# Patient Record
Sex: FEMALE | Race: WHITE | Employment: OTHER
[De-identification: names, ages, dates, MRNs, and addresses within clinical notes are randomized per-mention and may not be internally consistent; named-entity substitution may affect disease eponyms.]

---

## 2024-08-30 ENCOUNTER — APPOINTMENT (OUTPATIENT)
Facility: HOSPITAL | Age: 69
DRG: 641 | End: 2024-08-30
Payer: MEDICARE

## 2024-08-30 ENCOUNTER — HOSPITAL ENCOUNTER (INPATIENT)
Facility: HOSPITAL | Age: 69
LOS: 3 days | Discharge: HOME | DRG: 641 | End: 2024-09-02
Attending: STUDENT IN AN ORGANIZED HEALTH CARE EDUCATION/TRAINING PROGRAM | Admitting: INTERNAL MEDICINE
Payer: MEDICARE

## 2024-08-30 DIAGNOSIS — R53.1 GENERALIZED WEAKNESS: Primary | ICD-10-CM

## 2024-08-30 DIAGNOSIS — E83.52 HYPERCALCEMIA: ICD-10-CM

## 2024-08-30 DIAGNOSIS — N28.9 RENAL INSUFFICIENCY: ICD-10-CM

## 2024-08-30 DIAGNOSIS — R42 DIZZINESS: ICD-10-CM

## 2024-08-30 LAB
ALBUMIN SERPL-MCNC: 3.6 G/DL (ref 3.5–5)
ALBUMIN/GLOB SERPL: 0.8 (ref 1.1–2.2)
ALP SERPL-CCNC: 70 U/L (ref 45–117)
ALT SERPL-CCNC: 24 U/L (ref 12–78)
ANION GAP SERPL CALC-SCNC: 8 MMOL/L (ref 5–15)
APPEARANCE UR: CLEAR
AST SERPL-CCNC: 39 U/L (ref 15–37)
BACTERIA URNS QL MICRO: NEGATIVE /HPF
BASOPHILS # BLD: 0 K/UL (ref 0–0.1)
BASOPHILS NFR BLD: 0 % (ref 0–1)
BILIRUB SERPL-MCNC: 0.9 MG/DL (ref 0.2–1)
BILIRUB UR QL: NEGATIVE
BUN SERPL-MCNC: 52 MG/DL (ref 6–20)
BUN/CREAT SERPL: 26 (ref 12–20)
CALCIUM SERPL-MCNC: 13.3 MG/DL (ref 8.5–10.1)
CHLORIDE SERPL-SCNC: 101 MMOL/L (ref 97–108)
CO2 SERPL-SCNC: 30 MMOL/L (ref 21–32)
COLOR UR: NORMAL
COMMENT:: NORMAL
CREAT SERPL-MCNC: 1.99 MG/DL (ref 0.55–1.02)
DIFFERENTIAL METHOD BLD: ABNORMAL
EKG ATRIAL RATE: 61 BPM
EKG DIAGNOSIS: NORMAL
EKG P AXIS: 59 DEGREES
EKG P-R INTERVAL: 158 MS
EKG Q-T INTERVAL: 392 MS
EKG QRS DURATION: 76 MS
EKG QTC CALCULATION (BAZETT): 394 MS
EKG R AXIS: 11 DEGREES
EKG T AXIS: 24 DEGREES
EKG VENTRICULAR RATE: 61 BPM
EOSINOPHIL # BLD: 0.1 K/UL (ref 0–0.4)
EOSINOPHIL NFR BLD: 1 % (ref 0–7)
EPITH CASTS URNS QL MICRO: NORMAL /LPF
ERYTHROCYTE [DISTWIDTH] IN BLOOD BY AUTOMATED COUNT: 14 % (ref 11.5–14.5)
GLOBULIN SER CALC-MCNC: 4.6 G/DL (ref 2–4)
GLUCOSE SERPL-MCNC: 102 MG/DL (ref 65–100)
GLUCOSE UR STRIP.AUTO-MCNC: NEGATIVE MG/DL
HCT VFR BLD AUTO: 35.6 % (ref 35–47)
HGB BLD-MCNC: 11.8 G/DL (ref 11.5–16)
HGB UR QL STRIP: NEGATIVE
IMM GRANULOCYTES # BLD AUTO: 0 K/UL (ref 0–0.04)
IMM GRANULOCYTES NFR BLD AUTO: 0 % (ref 0–0.5)
KETONES UR QL STRIP.AUTO: NEGATIVE MG/DL
LEUKOCYTE ESTERASE UR QL STRIP.AUTO: NEGATIVE
LYMPHOCYTES # BLD: 2.6 K/UL (ref 0.8–3.5)
LYMPHOCYTES NFR BLD: 31 % (ref 12–49)
MAGNESIUM SERPL-MCNC: 1.5 MG/DL (ref 1.6–2.4)
MCH RBC QN AUTO: 28.2 PG (ref 26–34)
MCHC RBC AUTO-ENTMCNC: 33.1 G/DL (ref 30–36.5)
MCV RBC AUTO: 85 FL (ref 80–99)
MONOCYTES # BLD: 1.1 K/UL (ref 0–1)
MONOCYTES NFR BLD: 13 % (ref 5–13)
NEUTS SEG # BLD: 4.5 K/UL (ref 1.8–8)
NEUTS SEG NFR BLD: 55 % (ref 32–75)
NITRITE UR QL STRIP.AUTO: NEGATIVE
NRBC # BLD: 0 K/UL (ref 0–0.01)
NRBC BLD-RTO: 0 PER 100 WBC
PH UR STRIP: 6.5 (ref 5–8)
PLATELET # BLD AUTO: 218 K/UL (ref 150–400)
PMV BLD AUTO: 12 FL (ref 8.9–12.9)
POTASSIUM SERPL-SCNC: 3.7 MMOL/L (ref 3.5–5.1)
PROT SERPL-MCNC: 8.2 G/DL (ref 6.4–8.2)
PROT UR STRIP-MCNC: NEGATIVE MG/DL
RBC # BLD AUTO: 4.19 M/UL (ref 3.8–5.2)
RBC #/AREA URNS HPF: NORMAL /HPF (ref 0–5)
SODIUM SERPL-SCNC: 139 MMOL/L (ref 136–145)
SP GR UR REFRACTOMETRY: 1.01 (ref 1–1.03)
T4 FREE SERPL-MCNC: 1.2 NG/DL (ref 0.8–1.5)
TSH SERPL DL<=0.05 MIU/L-ACNC: 2.67 UIU/ML (ref 0.36–3.74)
UROBILINOGEN UR QL STRIP.AUTO: 0.2 EU/DL (ref 0.2–1)
WBC # BLD AUTO: 8.3 K/UL (ref 3.6–11)
WBC URNS QL MICRO: NORMAL /HPF (ref 0–4)

## 2024-08-30 PROCEDURE — 99285 EMERGENCY DEPT VISIT HI MDM: CPT

## 2024-08-30 PROCEDURE — 2580000003 HC RX 258: Performed by: STUDENT IN AN ORGANIZED HEALTH CARE EDUCATION/TRAINING PROGRAM

## 2024-08-30 PROCEDURE — 36415 COLL VENOUS BLD VENIPUNCTURE: CPT

## 2024-08-30 PROCEDURE — 1200000000 HC SEMI PRIVATE

## 2024-08-30 PROCEDURE — 84443 ASSAY THYROID STIM HORMONE: CPT

## 2024-08-30 PROCEDURE — 6360000002 HC RX W HCPCS: Performed by: INTERNAL MEDICINE

## 2024-08-30 PROCEDURE — 93005 ELECTROCARDIOGRAM TRACING: CPT | Performed by: STUDENT IN AN ORGANIZED HEALTH CARE EDUCATION/TRAINING PROGRAM

## 2024-08-30 PROCEDURE — 2580000003 HC RX 258: Performed by: INTERNAL MEDICINE

## 2024-08-30 PROCEDURE — 71045 X-RAY EXAM CHEST 1 VIEW: CPT

## 2024-08-30 PROCEDURE — 85025 COMPLETE CBC W/AUTO DIFF WBC: CPT

## 2024-08-30 PROCEDURE — 93010 ELECTROCARDIOGRAM REPORT: CPT | Performed by: SPECIALIST

## 2024-08-30 PROCEDURE — 84439 ASSAY OF FREE THYROXINE: CPT

## 2024-08-30 PROCEDURE — 81001 URINALYSIS AUTO W/SCOPE: CPT

## 2024-08-30 PROCEDURE — 83735 ASSAY OF MAGNESIUM: CPT

## 2024-08-30 PROCEDURE — 80053 COMPREHEN METABOLIC PANEL: CPT

## 2024-08-30 PROCEDURE — 70450 CT HEAD/BRAIN W/O DYE: CPT

## 2024-08-30 RX ORDER — MAGNESIUM SULFATE 1 G/100ML
1000 INJECTION INTRAVENOUS ONCE
Status: COMPLETED | OUTPATIENT
Start: 2024-08-30 | End: 2024-08-30

## 2024-08-30 RX ORDER — HEPARIN SODIUM 5000 [USP'U]/ML
5000 INJECTION, SOLUTION INTRAVENOUS; SUBCUTANEOUS EVERY 8 HOURS SCHEDULED
Status: DISCONTINUED | OUTPATIENT
Start: 2024-08-30 | End: 2024-09-02 | Stop reason: HOSPADM

## 2024-08-30 RX ORDER — SODIUM CHLORIDE 9 MG/ML
INJECTION, SOLUTION INTRAVENOUS PRN
Status: DISCONTINUED | OUTPATIENT
Start: 2024-08-30 | End: 2024-09-02 | Stop reason: HOSPADM

## 2024-08-30 RX ORDER — POLYETHYLENE GLYCOL 3350 17 G/17G
17 POWDER, FOR SOLUTION ORAL DAILY PRN
Status: DISCONTINUED | OUTPATIENT
Start: 2024-08-30 | End: 2024-09-02 | Stop reason: HOSPADM

## 2024-08-30 RX ORDER — ACETAMINOPHEN 325 MG/1
650 TABLET ORAL EVERY 6 HOURS PRN
Status: DISCONTINUED | OUTPATIENT
Start: 2024-08-30 | End: 2024-09-02 | Stop reason: HOSPADM

## 2024-08-30 RX ORDER — SODIUM CHLORIDE 0.9 % (FLUSH) 0.9 %
5-40 SYRINGE (ML) INJECTION EVERY 12 HOURS SCHEDULED
Status: DISCONTINUED | OUTPATIENT
Start: 2024-08-30 | End: 2024-09-02 | Stop reason: HOSPADM

## 2024-08-30 RX ORDER — ONDANSETRON 4 MG/1
4 TABLET, ORALLY DISINTEGRATING ORAL EVERY 8 HOURS PRN
Status: DISCONTINUED | OUTPATIENT
Start: 2024-08-30 | End: 2024-09-02 | Stop reason: HOSPADM

## 2024-08-30 RX ORDER — ONDANSETRON 2 MG/ML
4 INJECTION INTRAMUSCULAR; INTRAVENOUS EVERY 6 HOURS PRN
Status: DISCONTINUED | OUTPATIENT
Start: 2024-08-30 | End: 2024-09-02 | Stop reason: HOSPADM

## 2024-08-30 RX ORDER — 0.9 % SODIUM CHLORIDE 0.9 %
1000 INTRAVENOUS SOLUTION INTRAVENOUS ONCE
Status: COMPLETED | OUTPATIENT
Start: 2024-08-30 | End: 2024-08-30

## 2024-08-30 RX ORDER — ACETAMINOPHEN 650 MG/1
650 SUPPOSITORY RECTAL EVERY 6 HOURS PRN
Status: DISCONTINUED | OUTPATIENT
Start: 2024-08-30 | End: 2024-09-02 | Stop reason: HOSPADM

## 2024-08-30 RX ORDER — SODIUM CHLORIDE 0.9 % (FLUSH) 0.9 %
5-40 SYRINGE (ML) INJECTION PRN
Status: DISCONTINUED | OUTPATIENT
Start: 2024-08-30 | End: 2024-09-02 | Stop reason: HOSPADM

## 2024-08-30 RX ORDER — IOPAMIDOL 755 MG/ML
100 INJECTION, SOLUTION INTRAVASCULAR
Status: DISCONTINUED | OUTPATIENT
Start: 2024-08-30 | End: 2024-08-30

## 2024-08-30 RX ORDER — SODIUM CHLORIDE 9 MG/ML
INJECTION, SOLUTION INTRAVENOUS CONTINUOUS
Status: DISCONTINUED | OUTPATIENT
Start: 2024-08-30 | End: 2024-09-02 | Stop reason: HOSPADM

## 2024-08-30 RX ADMIN — MAGNESIUM SULFATE HEPTAHYDRATE 1000 MG: 1 INJECTION, SOLUTION INTRAVENOUS at 22:42

## 2024-08-30 RX ADMIN — SODIUM CHLORIDE 1000 ML: 9 INJECTION, SOLUTION INTRAVENOUS at 12:45

## 2024-08-30 RX ADMIN — HEPARIN SODIUM 5000 UNITS: 5000 INJECTION INTRAVENOUS; SUBCUTANEOUS at 22:34

## 2024-08-30 RX ADMIN — SODIUM CHLORIDE: 9 INJECTION, SOLUTION INTRAVENOUS at 22:50

## 2024-08-30 ASSESSMENT — PAIN - FUNCTIONAL ASSESSMENT: PAIN_FUNCTIONAL_ASSESSMENT: NONE - DENIES PAIN

## 2024-08-30 NOTE — ED NOTES
TRANSFER - OUT REPORT:    Verbal report given to RN Jody hesham Joiner  being transferred to  for routine progression of patient care       Report consisted of patient's Situation, Background, Assessment and   Recommendations(SBAR).     Information from the following report(s) Nurse Handoff Report, Index, ED Encounter Summary, ED SBAR, and MAR was reviewed with the receiving nurse.    [place] Fall Assessment:    Presents to emergency department  because of falls (Syncope, seizure, or loss of consciousness): No  Age > 70: No  Altered Mental Status, Intoxication with alcohol or substance confusion (Disorientation, impaired judgment, poor safety awaremess, or inability to follow instructions): No  Impaired Mobility: Ambulates or transfers with assistive devices or assistance; Unable to ambulate or transer.: No  Nursing Judgement: No          Lines:   Peripheral IV 08/30/24 Right Antecubital (Active)   Site Assessment Clean, dry & intact 08/30/24 1146   Line Status Blood return noted;Brisk blood return;Flushed;Normal saline locked;Specimen collected 08/30/24 1146   Line Care Connections checked and tightened 08/30/24 1146   Dressing Status New dressing applied;Clean, dry & intact 08/30/24 1146   Dressing Type Transparent 08/30/24 1146   Dressing Intervention New 08/30/24 1146        Opportunity for questions and clarification was provided.      Patient transported with:  Monitor  H2H

## 2024-08-30 NOTE — ED PROVIDER NOTES
Systems   Constitutional:  Positive for appetite change and fatigue.   Gastrointestinal:  Positive for nausea.   Musculoskeletal:  Positive for gait problem.   Neurological:  Positive for dizziness and numbness.       Except as noted above the remainder of the review of systems was reviewed and negative.       PAST MEDICAL HISTORY   No past medical history on file.      SURGICAL HISTORY     No past surgical history on file.      CURRENT MEDICATIONS       Previous Medications    No medications on file       ALLERGIES     Patient has no known allergies.    FAMILY HISTORY     No family history on file.       SOCIAL HISTORY       Social History     Socioeconomic History    Marital status:    Tobacco Use    Smoking status: Never    Smokeless tobacco: Never   Substance and Sexual Activity    Drug use: Never           PHYSICAL EXAM    (up to 7 for level 4, 8 or more for level 5)     ED Triage Vitals [08/30/24 1137]   BP Systolic BP Percentile Diastolic BP Percentile Temp Temp Source Pulse Respirations SpO2   138/81 -- -- 97.6 °F (36.4 °C) Oral 64 16 97 %      Height Weight - Scale         1.6 m (5' 3\") 80.7 kg (177 lb 14.6 oz)             Body mass index is 31.52 kg/m².    Physical Exam  Vitals and nursing note reviewed.   Constitutional:       General: She is not in acute distress.     Appearance: Normal appearance. She is not ill-appearing.   HENT:      Head: Normocephalic and atraumatic.      Nose: Nose normal.      Mouth/Throat:      Mouth: Mucous membranes are moist.   Eyes:      Extraocular Movements: Extraocular movements intact.      Pupils: Pupils are equal, round, and reactive to light.   Cardiovascular:      Rate and Rhythm: Normal rate and regular rhythm.      Pulses: Normal pulses.      Heart sounds: Normal heart sounds. No murmur heard.     Comments: Equal radial, dp, pt pulses  Pulmonary:      Effort: Pulmonary effort is normal. No respiratory distress.      Breath sounds: Normal breath sounds.

## 2024-08-30 NOTE — H&P
History and Physical    Date of Service:  8/30/2024  Primary Care Provider: No primary care provider on file.  Source of information: The patient and review of EMR    Chief Complaint: Pruritis, Fatigue, and Dizziness (Presented to the ED with generalized itching, no rash, fatigue, dizziness, craving lemonade for a week. )      History of Presenting Illness:   Shana Joiner is a 69 y.o. female with no significant past medical history presented at [facility] emergency room with weakness and malaise.  Patient symptoms started several months ago.  The weakness was described as generalized nonfocal weakness.  She has also been experiencing a numbness and tingling of bilateral upper and lower extremity.  Patient also reported poor appetite.  Patient also presented with dizziness which she described as room spinning around her.  She is unsteady on her feet and feel like she is going to fall.  She went to [facility] today for evaluation and was sent to the emergency room because of unsteady gait.  CT of the head done on arrival at the emergency room is negative for acute pathology lab work shows significant renal function abnormality as well as hypercalcemia.  She was referred to the hospitalist service for admission..          REVIEW OF SYSTEMS:  REVIEW OF SYSTEMS:  HEAD, EYES, EARS, NOSE AND THROAT:  No headache.  Positive for dizziness.  No blurring of vision.  No photophobia.  RESPIRATORY SYSTEM:  No shortness of breath.  No cough.  No hemoptysis.  CARDIOVASCULAR SYSTEM:  No chest pain.  No orthopnea.  No palpitations.  GASTROINTESTINAL SYSTEM:  No nausea or vomiting.  No diarrhea.  No constipation.  GENITOURINARY SYSTEM:  No dysuria, no urgency, and no frequency.     All other systems are reviewed and they are negative.        No past medical history on file.   No past surgical history on file.  Prior to Admission medications    Not on File     No Known Allergies   No family history on file.

## 2024-08-30 NOTE — ED TRIAGE NOTES
Presented to the ED with generalized itching, no rash, fatigue, dizziness, craving lemonade for a week. From OOT , no PMD. States that she and her  have not been feeling well. AOX4, ambulated with steady gait to room.

## 2024-08-31 ENCOUNTER — APPOINTMENT (OUTPATIENT)
Facility: HOSPITAL | Age: 69
DRG: 641 | End: 2024-08-31
Payer: MEDICARE

## 2024-08-31 LAB
25(OH)D3 SERPL-MCNC: 28.8 NG/ML (ref 30–100)
ALBUMIN SERPL-MCNC: 3.2 G/DL (ref 3.5–5)
ALBUMIN/GLOB SERPL: 0.8 (ref 1.1–2.2)
ALP SERPL-CCNC: 65 U/L (ref 45–117)
ALT SERPL-CCNC: 20 U/L (ref 12–78)
ANION GAP SERPL CALC-SCNC: 3 MMOL/L (ref 5–15)
AST SERPL-CCNC: 37 U/L (ref 15–37)
BASOPHILS # BLD: 0 K/UL (ref 0–0.1)
BASOPHILS NFR BLD: 0 % (ref 0–1)
BILIRUB SERPL-MCNC: 0.9 MG/DL (ref 0.2–1)
BUN SERPL-MCNC: 38 MG/DL (ref 6–20)
BUN/CREAT SERPL: 22 (ref 12–20)
CALCIUM SERPL-MCNC: 11.9 MG/DL (ref 8.5–10.1)
CALCIUM SERPL-MCNC: 12 MG/DL (ref 8.5–10.1)
CHLORIDE SERPL-SCNC: 111 MMOL/L (ref 97–108)
CO2 SERPL-SCNC: 28 MMOL/L (ref 21–32)
CREAT SERPL-MCNC: 1.76 MG/DL (ref 0.55–1.02)
DIFFERENTIAL METHOD BLD: ABNORMAL
EOSINOPHIL # BLD: 0.1 K/UL (ref 0–0.4)
EOSINOPHIL NFR BLD: 1 % (ref 0–7)
ERYTHROCYTE [DISTWIDTH] IN BLOOD BY AUTOMATED COUNT: 13.9 % (ref 11.5–14.5)
FOLATE SERPL-MCNC: 18.9 NG/ML (ref 5–21)
GLOBULIN SER CALC-MCNC: 4 G/DL (ref 2–4)
GLUCOSE SERPL-MCNC: 85 MG/DL (ref 65–100)
HCT VFR BLD AUTO: 32.2 % (ref 35–47)
HGB BLD-MCNC: 10.5 G/DL (ref 11.5–16)
IMM GRANULOCYTES # BLD AUTO: 0 K/UL (ref 0–0.04)
IMM GRANULOCYTES NFR BLD AUTO: 0 % (ref 0–0.5)
LYMPHOCYTES # BLD: 1.9 K/UL (ref 0.8–3.5)
LYMPHOCYTES NFR BLD: 38 % (ref 12–49)
MAGNESIUM SERPL-MCNC: 2 MG/DL (ref 1.6–2.4)
MCH RBC QN AUTO: 27.9 PG (ref 26–34)
MCHC RBC AUTO-ENTMCNC: 32.6 G/DL (ref 30–36.5)
MCV RBC AUTO: 85.6 FL (ref 80–99)
MONOCYTES # BLD: 0.6 K/UL (ref 0–1)
MONOCYTES NFR BLD: 12 % (ref 5–13)
NEUTS SEG # BLD: 2.4 K/UL (ref 1.8–8)
NEUTS SEG NFR BLD: 49 % (ref 32–75)
NRBC # BLD: 0 K/UL (ref 0–0.01)
NRBC BLD-RTO: 0 PER 100 WBC
PHOSPHATE SERPL-MCNC: 2.8 MG/DL (ref 2.6–4.7)
PLATELET # BLD AUTO: 204 K/UL (ref 150–400)
PMV BLD AUTO: 11.8 FL (ref 8.9–12.9)
POTASSIUM SERPL-SCNC: 3.8 MMOL/L (ref 3.5–5.1)
PROT SERPL-MCNC: 7.2 G/DL (ref 6.4–8.2)
PTH-INTACT SERPL-MCNC: <6.3 PG/ML (ref 18.4–88)
RBC # BLD AUTO: 3.76 M/UL (ref 3.8–5.2)
SODIUM SERPL-SCNC: 142 MMOL/L (ref 136–145)
VIT B12 SERPL-MCNC: 586 PG/ML (ref 193–986)
WBC # BLD AUTO: 4.9 K/UL (ref 3.6–11)

## 2024-08-31 PROCEDURE — 84100 ASSAY OF PHOSPHORUS: CPT

## 2024-08-31 PROCEDURE — 80053 COMPREHEN METABOLIC PANEL: CPT

## 2024-08-31 PROCEDURE — 6370000000 HC RX 637 (ALT 250 FOR IP): Performed by: INTERNAL MEDICINE

## 2024-08-31 PROCEDURE — 2580000003 HC RX 258: Performed by: INTERNAL MEDICINE

## 2024-08-31 PROCEDURE — 85025 COMPLETE CBC W/AUTO DIFF WBC: CPT

## 2024-08-31 PROCEDURE — 6360000002 HC RX W HCPCS: Performed by: INTERNAL MEDICINE

## 2024-08-31 PROCEDURE — 82306 VITAMIN D 25 HYDROXY: CPT

## 2024-08-31 PROCEDURE — 83521 IG LIGHT CHAINS FREE EACH: CPT

## 2024-08-31 PROCEDURE — 84165 PROTEIN E-PHORESIS SERUM: CPT

## 2024-08-31 PROCEDURE — 82397 CHEMILUMINESCENT ASSAY: CPT

## 2024-08-31 PROCEDURE — 82652 VIT D 1 25-DIHYDROXY: CPT

## 2024-08-31 PROCEDURE — 82746 ASSAY OF FOLIC ACID SERUM: CPT

## 2024-08-31 PROCEDURE — 83735 ASSAY OF MAGNESIUM: CPT

## 2024-08-31 PROCEDURE — 82607 VITAMIN B-12: CPT

## 2024-08-31 PROCEDURE — 83970 ASSAY OF PARATHORMONE: CPT

## 2024-08-31 PROCEDURE — 1200000000 HC SEMI PRIVATE

## 2024-08-31 PROCEDURE — 71250 CT THORAX DX C-: CPT

## 2024-08-31 PROCEDURE — 76937 US GUIDE VASCULAR ACCESS: CPT

## 2024-08-31 PROCEDURE — 74176 CT ABD & PELVIS W/O CONTRAST: CPT

## 2024-08-31 PROCEDURE — 36415 COLL VENOUS BLD VENIPUNCTURE: CPT

## 2024-08-31 RX ADMIN — SODIUM CHLORIDE: 9 INJECTION, SOLUTION INTRAVENOUS at 06:38

## 2024-08-31 RX ADMIN — SODIUM CHLORIDE, PRESERVATIVE FREE 10 ML: 5 INJECTION INTRAVENOUS at 20:33

## 2024-08-31 RX ADMIN — SODIUM CHLORIDE: 9 INJECTION, SOLUTION INTRAVENOUS at 23:19

## 2024-08-31 RX ADMIN — ACETAMINOPHEN 650 MG: 325 TABLET ORAL at 20:44

## 2024-08-31 RX ADMIN — HEPARIN SODIUM 5000 UNITS: 5000 INJECTION INTRAVENOUS; SUBCUTANEOUS at 20:33

## 2024-08-31 ASSESSMENT — PAIN DESCRIPTION - LOCATION: LOCATION: ABDOMEN

## 2024-08-31 ASSESSMENT — PAIN SCALES - GENERAL: PAINLEVEL_OUTOF10: 3

## 2024-08-31 NOTE — PROCEDURES
Vascular Access Team - peripheral IV catheter insertion note     A 22 gauge, 45 mm (1.75 inch) PIV was inserted into the left ventral forearm using ultrasound guidance. The IV flushed easily and had brisk blood return. The patient tolerated the procedure well.   Thomas Burns RN

## 2024-08-31 NOTE — PLAN OF CARE
Problem: Discharge Planning  Goal: Discharge to home or other facility with appropriate resources  8/31/2024 1411 by Kenzie Montes, RN  Outcome: Progressing  8/31/2024 1410 by Kenzie Montes, RN  Outcome: Progressing  8/31/2024 0404 by Odalys Daniel, RN  Outcome: Progressing     Problem: Safety - Adult  Goal: Free from fall injury  Outcome: Progressing     Problem: Skin/Tissue Integrity  Goal: Absence of new skin breakdown  Description: 1.  Monitor for areas of redness and/or skin breakdown  2.  Assess vascular access sites hourly  3.  Every 4-6 hours minimum:  Change oxygen saturation probe site  4.  Every 4-6 hours:  If on nasal continuous positive airway pressure, respiratory therapy assess nares and determine need for appliance change or resting period.  Outcome: Progressing

## 2024-09-01 LAB
ANION GAP SERPL CALC-SCNC: 3 MMOL/L (ref 5–15)
BASOPHILS # BLD: 0 K/UL (ref 0–0.1)
BASOPHILS NFR BLD: 0 % (ref 0–1)
BUN SERPL-MCNC: 37 MG/DL (ref 6–20)
BUN/CREAT SERPL: 21 (ref 12–20)
CALCIUM SERPL-MCNC: 11.9 MG/DL (ref 8.5–10.1)
CANCER AG125 SERPL-ACNC: 19 U/ML (ref 1.5–35)
CEA SERPL-MCNC: 0.9 NG/ML
CHLORIDE SERPL-SCNC: 113 MMOL/L (ref 97–108)
CO2 SERPL-SCNC: 26 MMOL/L (ref 21–32)
CREAT SERPL-MCNC: 1.75 MG/DL (ref 0.55–1.02)
DIFFERENTIAL METHOD BLD: ABNORMAL
EOSINOPHIL # BLD: 0.1 K/UL (ref 0–0.4)
EOSINOPHIL NFR BLD: 2 % (ref 0–7)
ERYTHROCYTE [DISTWIDTH] IN BLOOD BY AUTOMATED COUNT: 13.8 % (ref 11.5–14.5)
FLOW CYTOMETRY: NORMAL
GLUCOSE SERPL-MCNC: 97 MG/DL (ref 65–100)
HCT VFR BLD AUTO: 28.3 % (ref 35–47)
HGB BLD-MCNC: 9.4 G/DL (ref 11.5–16)
IMM GRANULOCYTES # BLD AUTO: 0 K/UL (ref 0–0.04)
IMM GRANULOCYTES NFR BLD AUTO: 0 % (ref 0–0.5)
LDH SERPL L TO P-CCNC: 370 U/L (ref 81–246)
LYMPHOCYTES # BLD: 1.9 K/UL (ref 0.8–3.5)
LYMPHOCYTES NFR BLD: 40 % (ref 12–49)
MCH RBC QN AUTO: 28.5 PG (ref 26–34)
MCHC RBC AUTO-ENTMCNC: 33.2 G/DL (ref 30–36.5)
MCV RBC AUTO: 85.8 FL (ref 80–99)
MONOCYTES # BLD: 0.6 K/UL (ref 0–1)
MONOCYTES NFR BLD: 12 % (ref 5–13)
NEUTS SEG # BLD: 2.2 K/UL (ref 1.8–8)
NEUTS SEG NFR BLD: 46 % (ref 32–75)
NRBC # BLD: 0 K/UL (ref 0–0.01)
NRBC BLD-RTO: 0 PER 100 WBC
PLATELET # BLD AUTO: 190 K/UL (ref 150–400)
PMV BLD AUTO: 11.3 FL (ref 8.9–12.9)
POTASSIUM SERPL-SCNC: 3.7 MMOL/L (ref 3.5–5.1)
RBC # BLD AUTO: 3.3 M/UL (ref 3.8–5.2)
SODIUM SERPL-SCNC: 142 MMOL/L (ref 136–145)
WBC # BLD AUTO: 4.8 K/UL (ref 3.6–11)

## 2024-09-01 PROCEDURE — 84165 PROTEIN E-PHORESIS SERUM: CPT

## 2024-09-01 PROCEDURE — 82232 ASSAY OF BETA-2 PROTEIN: CPT

## 2024-09-01 PROCEDURE — 86300 IMMUNOASSAY TUMOR CA 15-3: CPT

## 2024-09-01 PROCEDURE — 80048 BASIC METABOLIC PNL TOTAL CA: CPT

## 2024-09-01 PROCEDURE — 82105 ALPHA-FETOPROTEIN SERUM: CPT

## 2024-09-01 PROCEDURE — 82378 CARCINOEMBRYONIC ANTIGEN: CPT

## 2024-09-01 PROCEDURE — 2580000003 HC RX 258: Performed by: INTERNAL MEDICINE

## 2024-09-01 PROCEDURE — 86304 IMMUNOASSAY TUMOR CA 125: CPT

## 2024-09-01 PROCEDURE — 36415 COLL VENOUS BLD VENIPUNCTURE: CPT

## 2024-09-01 PROCEDURE — 6360000002 HC RX W HCPCS: Performed by: INTERNAL MEDICINE

## 2024-09-01 PROCEDURE — 1200000000 HC SEMI PRIVATE

## 2024-09-01 PROCEDURE — 99221 1ST HOSP IP/OBS SF/LOW 40: CPT | Performed by: SURGERY

## 2024-09-01 PROCEDURE — 84155 ASSAY OF PROTEIN SERUM: CPT

## 2024-09-01 PROCEDURE — 86301 IMMUNOASSAY TUMOR CA 19-9: CPT

## 2024-09-01 PROCEDURE — 85025 COMPLETE CBC W/AUTO DIFF WBC: CPT

## 2024-09-01 PROCEDURE — 83615 LACTATE (LD) (LDH) ENZYME: CPT

## 2024-09-01 RX ADMIN — SODIUM CHLORIDE, PRESERVATIVE FREE 10 ML: 5 INJECTION INTRAVENOUS at 22:00

## 2024-09-01 RX ADMIN — SODIUM CHLORIDE, PRESERVATIVE FREE 10 ML: 5 INJECTION INTRAVENOUS at 08:20

## 2024-09-01 RX ADMIN — HEPARIN SODIUM 5000 UNITS: 5000 INJECTION INTRAVENOUS; SUBCUTANEOUS at 04:35

## 2024-09-01 ASSESSMENT — PAIN SCALES - GENERAL
PAINLEVEL_OUTOF10: 0

## 2024-09-02 VITALS
HEIGHT: 63 IN | HEART RATE: 67 BPM | RESPIRATION RATE: 18 BRPM | TEMPERATURE: 98.2 F | BODY MASS INDEX: 31.52 KG/M2 | SYSTOLIC BLOOD PRESSURE: 107 MMHG | OXYGEN SATURATION: 96 % | DIASTOLIC BLOOD PRESSURE: 56 MMHG | WEIGHT: 177.91 LBS

## 2024-09-02 LAB
1,25(OH)2D3 SERPL-MCNC: 131 PG/ML (ref 24.8–81.5)
ANION GAP SERPL CALC-SCNC: 3 MMOL/L (ref 5–15)
BUN SERPL-MCNC: 28 MG/DL (ref 6–20)
BUN/CREAT SERPL: 18 (ref 12–20)
CALCIUM SERPL-MCNC: 11.3 MG/DL (ref 8.5–10.1)
CHLORIDE SERPL-SCNC: 113 MMOL/L (ref 97–108)
CO2 SERPL-SCNC: 27 MMOL/L (ref 21–32)
CREAT SERPL-MCNC: 1.53 MG/DL (ref 0.55–1.02)
GLUCOSE SERPL-MCNC: 87 MG/DL (ref 65–100)
MAGNESIUM SERPL-MCNC: 1.6 MG/DL (ref 1.6–2.4)
POTASSIUM SERPL-SCNC: 3.5 MMOL/L (ref 3.5–5.1)
SODIUM SERPL-SCNC: 143 MMOL/L (ref 136–145)

## 2024-09-02 PROCEDURE — 83735 ASSAY OF MAGNESIUM: CPT

## 2024-09-02 PROCEDURE — 80048 BASIC METABOLIC PNL TOTAL CA: CPT

## 2024-09-02 PROCEDURE — 99232 SBSQ HOSP IP/OBS MODERATE 35: CPT | Performed by: SURGERY

## 2024-09-02 PROCEDURE — 2580000003 HC RX 258: Performed by: INTERNAL MEDICINE

## 2024-09-02 RX ADMIN — SODIUM CHLORIDE: 9 INJECTION, SOLUTION INTRAVENOUS at 01:14

## 2024-09-02 ASSESSMENT — PAIN SCALES - GENERAL
PAINLEVEL_OUTOF10: 0
PAINLEVEL_OUTOF10: 0

## 2024-09-02 NOTE — PROGRESS NOTES
[facility] Adult  Hospitalist Group                                                                                          Hospitalist Progress Note  JOSE Epstein - NP  Office Phone: [phone]        Date of Service:  2024  NAME:  Shana Joiner  :  1955  MRN:  [id number]       Admission Summary:   Shana Joiner is a 69 y.o. female with no significant past medical history presented at [facility] freestanding emergency room with weakness and malaise.  Patient symptoms started several months ago.  The weakness was described as generalized nonfocal weakness.  She has also been experiencing a numbness and tingling of bilateral upper and lower extremity.  Patient also reported poor appetite.  Patient also presented with dizziness which she described as room spinning around her.  She is unsteady on her feet and feel like she is going to fall.  She went to [facility] today for evaluation and was sent to the emergency room because of unsteady gait.  CT of the head done on arrival at the emergency room is negative for acute pathology lab work shows significant renal function abnormality as well as hypercalcemia.  She was referred to the hospitalist service for admission.    Hospital Course     Does not take calcium supplements takes a liquid minerals supplement daily   no unintentional weight loss  Pruritus x 3 months, worse in the mornings  Low appetite x 1 week  Fatigue x 3 weeks  Dizziness seems to be better when walking     Interval history / Subjective:      Pending MRI of brain  Cr improved to 1.75    Assessment & Plan:     Hypercalcemia   Mesenteric lymphadenopathy.  Indeterminate 3.5 cm splenic lesion  - calcium 13.3 on admission,  11.9  - c/w IVF  - PTH < 6.3 (suggestive of malignancy)  - Ct chest/abd/pel: Mesenteric lymphadenopathy.  Indeterminate 3.5 cm splenic lesion  -  Oncology following, results are suggesting a Lymphoma, pt is aware  - no elevation of , 
Pt is alert and oriented x4. Pt refused IV fluid at this time.  
Surgery Progress Note    9/2/2024    Admit Date: 8/30/2024 11:30 AM    CC: wants to go home    Subjective:     Patient in need of biopsy to confirm diagnosis of lymphoma.  Slept well.  Is leaning toward surgical intervention.  Wants to go home    Constitutional: No fever or chills  Neurologic: No headache  Eyes: No scleral icterus or irritated eyes  Nose: No nasal pain or drainage  Mouth: No oral lesions or sore throat  Cardiac: No palpations or chest pain  Pulmonary: No cough or shortness of breath  Gastrointestinal: No nausea, emesis, diarrhea, or constipation  Genitourinary: No dysuria  Musculoskeletal: No muscle or joint tenderness  Skin: Itching  Psychiatric: No anxiety or depressed mood    Objective:     Vitals:    09/02/24 0539   BP:    Pulse: 67   Resp:    Temp:    SpO2:        General: No acute distress, conversant  Eyes: PERRLA, no scleral icterus  HENT: Normocephalic without oral lesions  Neck: Trachea midline without LAD  Cardiac: Normal pulse rate and rhythm  Pulmonary: Symmetric chest rise with normal effort  GI: Soft, NT, ND, no hernia, no splenomegaly  Skin: Warm without rash  Extremities: No edema or joint stiffness  Psych: Appropriate mood and affect    Labs, vital signs, and I/O reviewed.    Assessment:     69-year-old female with adenopathy of her abdomen with her retroperitoneal lymph nodes with other clinical symptoms suggestive of lymphoma    Plan:     I will review her imaging with Dr. Christian, surgical oncology, and discuss if she is a reasonable candidate for a surgical approach for this posterior lymph node.  It does appear to be very closely associated to many critical vascular structures.    I will have someone from my team reach out to her on Tuesday to confirm the plan.  From the surgery point of view okay for discharge with the remainder of the workup being done as an outpatient.    Shine Joyner MD, FACS, [facility]  Bariatric and General Surgeon  [facility], [facility]    
reviewed prior to creation of Plan.      LABS:  Recent Labs     08/31/24  0624 09/01/24  0045   WBC 4.9 4.8   HGB 10.5* 9.4*   HCT 32.2* 28.3*    190     Recent Labs     08/30/24  1141 08/31/24  0624 09/01/24  0003    142 142   K 3.7 3.8 3.7    111* 113*   CO2 30 28 26   BUN 52* 38* 37*   MG 1.5* 2.0  --    PHOS  --  2.8  --      Recent Labs     08/30/24  1141 08/31/24 0624   GLOB 4.6* 4.0     No results for input(s): \"INR\", \"APTT\" in the last 72 hours.    Invalid input(s): \"PTP\"   No results for input(s): \"TIBC\" in the last 72 hours.    Invalid input(s): \"FE\", \"PSAT\", \"FERR\"   No results found for: \"RBCF\"   No results for input(s): \"PH\", \"PCO2\", \"PO2\" in the last 72 hours.  No results for input(s): \"CPK\", \"CKMB\", \"TROPONINI\" in the last 72 hours.  No components found for: \"GLPOC\"  @labua@    MEDICATIONS:  Current Facility-Administered Medications   Medication Dose Route Frequency    sodium chloride flush 0.9 % injection 5-40 mL  5-40 mL IntraVENous 2 times per day    sodium chloride flush 0.9 % injection 5-40 mL  5-40 mL IntraVENous PRN    0.9 % sodium chloride infusion   IntraVENous PRN    ondansetron (ZOFRAN-ODT) disintegrating tablet 4 mg  4 mg Oral Q8H PRN    Or    ondansetron (ZOFRAN) injection 4 mg  4 mg IntraVENous Q6H PRN    polyethylene glycol (GLYCOLAX) packet 17 g  17 g Oral Daily PRN    acetaminophen (TYLENOL) tablet 650 mg  650 mg Oral Q6H PRN    Or    acetaminophen (TYLENOL) suppository 650 mg  650 mg Rectal Q6H PRN    heparin (porcine) injection 5,000 Units  5,000 Units SubCUTAneous 3 times per day    0.9 % sodium chloride infusion   IntraVENous Continuous     
JVD,  Chest: Clear to auscultation bilaterally, RA   CVS: RRR, S1 S2 heard  Abd: soft/ non tender, non distended, BS physiological,   Ext: no clubbing, no cyanosis, no edema, brisk 2+ DP pulses  Neuro/Psych: pleasant mood and affect, CN 2-12 grossly intact, sensory grossly within normal limit, Strength 5/5 in all extremities,   Skin: warm     Data Review:    Review and/or order of clinical lab test  Review and/or order of tests in the radiology section of CPT  Review and/or order of tests in the medicine section of CPT      I have personally and independently reviewed all pertinent labs, diagnostic studies, imaging, and have provided independent interpretation of the same.     Labs:     Recent Labs     08/30/24  1141 08/31/24  0624   WBC 8.3 4.9   HGB 11.8 10.5*   HCT 35.6 32.2*    204     Recent Labs     08/30/24  1141 08/31/24  0624    142   K 3.7 3.8    111*   CO2 30 28   BUN 52* 38*   MG 1.5* 2.0   PHOS  --  2.8     Recent Labs     08/30/24  1141 08/31/24  0624   ALT 24 20   GLOB 4.6* 4.0     No results for input(s): \"INR\", \"APTT\" in the last 72 hours.    Invalid input(s): \"PTP\"   No results for input(s): \"TIBC\" in the last 72 hours.    Invalid input(s): \"FE\", \"PSAT\", \"FERR\"   No results found for: \"RBCF\"   No results for input(s): \"PH\", \"PCO2\", \"PO2\" in the last 72 hours.  No results for input(s): \"CPK\" in the last 72 hours.    Invalid input(s): \"CPKMB\", \"CKNDX\", \"TROIQ\"  No results found for: \"CHOL\", \"CHLST\", \"CHOLV\", \"HDL\", \"HDLC\", \"LDL\"  No results found for: \"GLUCPOC\"  [unfilled]    Notes reviewed from all clinical/nonclinical/nursing services involved in patient's clinical care. Care coordination discussions were held with appropriate clinical/nonclinical/ nursing providers based on care coordination needs.         Patients current active Medications were reviewed, considered, added and adjusted based on the clinical condition today.      Home Medications were reconciled to the best of my

## 2024-09-02 NOTE — CONSULTS
Consultation Note    NAME: Shana Joiner   :  1955   MRN:  [id number]     Date/Time:  2024 9:26 AM    I have been asked to see this patient by Dr. Enriquez  for advice/opinion re: YURIDIA.     Assessment :    Plan:  YURIDIA  Hypercalcemia  Hypomagnesemia  Mild anemia Creatinine 2 to 1.8, bland urine; prerenal     Ca ~ 13; low PTH; f/u on CT scan - broaden w/u (check PTHrp, SPEP, flc, 1,25 and 25 VD)    Continue NS        Subjective:   CHIEF COMPLAINT:  yuridia    HISTORY OF PRESENT ILLNESS:     Shana is a 69 y.o.   female who has a history of pruritus for a few months. Ataxia for a week. Off and on fatigue and low appetite. Takes otc vit d. No regular calcium supplement.     No past medical history on file.   No past surgical history on file.  Social History     Tobacco Use    Smoking status: Never    Smokeless tobacco: Never   Substance Use Topics    Alcohol use: Not on file      No family history on file.   No Known Allergies   Prior to Admission medications    Not on File     REVIEW OF SYSTEMS:     []  Unable to obtain reliable ROS due to  [] mental status  [] sedated   [] intubated   [x] Total of 12 systems reviewed as follows:  Constitutional: negative fever, negative chills, negative weight loss  Eyes:   negative visual changes  ENT:   negative sore throat, tongue or lip swelling  Respiratory:  negative cough, negative dyspnea  Cards:  negative for chest pain, palpitations, lower extremity edema  GI:   negative for nausea, vomiting, diarrhea, and abdominal pain  :  negative for frequency, dysuria  Integument:  negative for rash   Heme:  negative for easy bruising and gum/nose bleeding  Musculoskel: negative for myalgias,  back pain and muscle weakness  Neuro:  negative for headaches, dizziness, vertigo  Psych:  negative for feelings of anxiety, depression   Travel?: none    Objective:   VITALS:    Vitals:    24 0837   BP: 124/76   Pulse: 67   Resp: 16   Temp: 98.1 °F (36.7 °C)   SpO2:      PHYSICAL 
-Hematology / Oncology ([facility]) -  -Primary Oncologist- n/a  -CC-  hypercalcemia, lymphadenopathy/ splenic lesion, with pruritis    -S-  no new complaints.  Feels ready to go home for f/u for surgical bx    -O-    Patient Vitals for the past 24 hrs:   Temp Pulse Resp BP SpO2   09/02/24 0600 -- 65 -- -- --   09/02/24 0539 -- 67 -- -- --   09/02/24 0355 -- 64 -- -- --   09/02/24 0328 98.2 °F (36.8 °C) 70 18 (!) 107/56 96 %   09/02/24 0200 -- 70 -- -- --   09/01/24 2159 -- 70 -- -- --   09/01/24 1954 -- 72 -- -- --   09/01/24 1904 97.9 °F (36.6 °C) 69 18 (!) 145/85 98 %   09/01/24 1400 -- 63 -- -- --   09/01/24 1200 -- 100 -- -- --   09/01/24 1000 -- (!) 123 -- -- --     No intake/output data recorded.  Gen: nad, pleasant, walking around room packing bags  Neuro: speech nl  Resp: no distress  Skin: normal color      -Labs-    Recent Labs     08/30/24  1141 08/31/24  0624 09/01/24  0003 09/01/24  0045 09/02/24  0437   WBC 8.3 4.9  --  4.8  --    HGB 11.8 10.5*  --  9.4*  --     204  --  190  --     142 142  --  143   K 3.7 3.8 3.7  --  3.5   BUN 52* 38* 37*  --  28*   ALT 24 20  --   --   --    MG 1.5* 2.0  --   --  1.6   PHOS  --  2.8  --   --   --        -Imaging-   CT c/a/p  8/30  1. Mesenteric lymphadenopathy, as described above.   2. Indeterminate 3.5 cm splenic lesion, as described above.   3. Repeat CT with oral and IV contrast may be helpful for further evaluation, as   indicated.     Images personally reviewed    -Assessment + Plan-     *) hypercalcemia: PTH low, 25(OH)D low.  stable at 11.3 on IVF  --- may have symptomatic recurrence if discharged, consider IV bisphosphonate if ok w/ nephrology    *) renal insufficiency: improved at cr 1.53    *) mesenteric DAVID/ splenic hypodensity/ hyperCa + suppression of PTH   --- LDH elevated (370)  --- no elevation of , CEA  --- clinical picture concerning for malignancy   --- so far, most suggestive of lymphoma. Pruritus may suggest Hodgkin.  --- f/u 
-Hematology / Oncology ([facility]) -  -Primary Oncologist- n/a  -CC- called for consult for \"hyperkalemia\" (on chart review, hypercalcemia)    68 yo woman presented to ER yesterday with issues including fatigue and generalized weakness x 1 wk with poor appetite and generalized migratory pruritus as well as polydipsia and numbness/ tingling in extremities.  Lab workup revealed hypercalcemia ( ca 13.3) and abnormal renal function (cr 1.99).  Globulin elevated (4.6) w/ abnl A:G ratio.  Initial hgb wnl (11.8), since admission minimal decilne (10.5).  PTH was suppressed.  CT c/a/p w/o oral or IV contrast noted mesenteric DAVID and 3.5 cm splenic lz.  Since admission Ca improved on IVF.  Nephrology evaluated; PTHrp/ spep/ flc/ vit D metabolites testing pending.    -S-  feeling better.  No palpable masses.  No weight loss beyond ~4 lb, always trying to loose weight, some loss of appetite over last week or so.  No back pain/ bone pain/ other pain.  UTD on health maintenance except due for colonoscopy (last ~ 10 yrs ago).    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 [employer]  -- no etoh or tob  FamHx: mother had bladder CA in her 80's  Complete ROS: notable as above, o/w unremarkable    -O-    Patient Vitals for the past 24 hrs:   Temp Pulse Resp BP SpO2   08/31/24 0837 98.1 °F (36.7 °C) 67 16 124/76 --   08/31/24 0354 -- 62 -- -- --   08/31/24 0313 98.1 °F (36.7 °C) 62 18 (!) 102/57 96 %   08/31/24 0153 -- 64 -- -- --   08/30/24 2154 -- 79 -- -- --   08/30/24 1957 -- 80 -- -- --   08/30/24 1946 97.5 °F (36.4 °C) 59 18 (!) 144/83 100 %   08/30/24 1800 -- 72 -- -- --   08/30/24 1600 97.9 °F (36.6 °C) 58 18 132/76 97 %   08/30/24 1500 -- 63 -- (!) 141/90 97 %   08/30/24 1445 98.4 °F (36.9 °C) 64 12 139/65 99 %   08/30/24 1400 -- 68 -- 138/78 98 %   08/30/24 1330 -- 66 12 (!) 149/73 99 %   08/30/24 1200 -- 63 14 (!) 143/84 95 %     No intake/output data 
General Surgery Consultation    CC: Itching    History of Present Illness:      Shana Joiner is a 69 y.o. female who presents with itching over the last few months.  Patient is new to the area.  She presented with itching, weakness and malaise.  No pain.  Pain is 0 out of 10.  No provoking or alleviating factors.  History of a hysterectomy.  She also reports some unsteady gait which was particularly worrisome for her.    No past medical history on file.  No past surgical history on file.   No family history on file.  Social History     Socioeconomic History    Marital status:    Tobacco Use    Smoking status: Never    Smokeless tobacco: Never   Substance and Sexual Activity    Drug use: Never      Prior to Admission medications    Not on File     No Known Allergies    Review of Systems:  Constitutional: Unsteady gait, weakness, malaise  Neurologic: No headache  Eyes: No scleral icterus or irritated eyes  Nose: No nasal pain or drainage  Mouth: No oral lesions or sore throat  Cardiac: No palpations or chest pain  Pulmonary: No cough or shortness of breath  Gastrointestinal: No nausea, emesis, diarrhea, or constipation  Genitourinary: No dysuria  Musculoskeletal: No muscle or joint tenderness  Skin: Itching  Psychiatric: No anxiety or depressed mood    Physical Exam:   Temp (24hrs), Av.9 °F (36.6 °C), Min:97.7 °F (36.5 °C), Max:98.1 °F (36.7 °C)      Vitals:    24 2159   BP:    Pulse: 70   Resp:    Temp:    SpO2:      General: No acute distress, conversant  Eyes: PERRLA, no scleral icterus  HENT: Normocephalic without oral lesions  Neck: Trachea midline without LAD  Cardiac: Normal pulse rate and rhythm  Pulmonary: Symmetric chest rise with normal effort  GI: Soft, NT, ND, no hernia, no splenomegaly  Skin: Warm without rash  Extremities: No edema or joint stiffness  Psych: Appropriate mood and affect    Assessment:     69-year-old female with intra-abdominal lymphadenopathy and splenic mass concerning 
excisional.  Consider surgery consultation.    Discussed with pt, questions addressed.  She asked how lymphoma would be treated, discussed generally would include chemo.  She states at this time she would reject chemo.  We reviewed many lymphomas (especially HL) can be cured with chemo and so tx would be life-saving.  She will continue to consider.  Seems agreeable to continuing to work towards dx.   
negative

## 2024-09-02 NOTE — DISCHARGE SUMMARY
Discharge Summary       PATIENT ID: Shana Joiner  MRN: [id number]   YOB: 1955    DATE OF ADMISSION: 8/30/2024 11:30 AM    DATE OF DISCHARGE: 9/2/2024   PRIMARY CARE PROVIDER: No primary care provider on file.     ATTENDING PHYSICIAN: Jf Parish MD   DISCHARGING PROVIDER: JOSE Boo CNP    To contact this individual call [phone] and ask the  to page.  If unavailable ask to be transferred the Adult Hospitalist Department.    CONSULTATIONS: IP CONSULT TO HOSPITALIST  IP CONSULT TO ONCOLOGY  IP CONSULT TO NEPHROLOGY  IP CONSULT TO GENERAL SURGERY    PROCEDURES/SURGERIES: * No surgery found *    ADMITTING DIAGNOSES & HOSPITAL COURSE:   Shana Joiner is a 69 y.o. female with no significant past medical history presented at [facility] emergency room with weakness and malaise.  Patient symptoms started several months ago.  The weakness was described as generalized nonfocal weakness.  She has also been experiencing a numbness and tingling of bilateral upper and lower extremity.  Patient also reported poor appetite.  Patient also presented with dizziness which she described as room spinning around her.  She is unsteady on her feet and feel like she is going to fall.  She went to [facility] today for evaluation and was sent to the emergency room because of unsteady gait.  CT of the head done on arrival at the emergency room is negative for acute pathology lab work shows significant renal function abnormality as well as hypercalcemia.  She was referred to the hospitalist service for admission.    Patient found to be hypercalcemic with Mesenteric lymphadenopathy and an indeterminate 3.5 cm splenic lesion. Surgery and oncology were consulted.  She will need further workup to confirm possible lymphoma diagnosis which according to surgery will be pursued outpatient.  Patient's dizziness has resolved and MRI will be deferred at this time and pursued as outpatient.  Calcium

## 2024-09-03 ENCOUNTER — TELEPHONE (OUTPATIENT)
Age: 69
End: 2024-09-03

## 2024-09-03 ENCOUNTER — PREP FOR PROCEDURE (OUTPATIENT)
Age: 69
End: 2024-09-03

## 2024-09-03 ENCOUNTER — ANESTHESIA EVENT (OUTPATIENT)
Facility: HOSPITAL | Age: 69
End: 2024-09-03
Payer: MEDICARE

## 2024-09-03 DIAGNOSIS — I88.0 MESENTERIC ADENITIS: ICD-10-CM

## 2024-09-03 LAB
AFP-TM SERPL-MCNC: 3.2 NG/ML (ref 0–9.2)
B2 MICROGLOB SERPL-MCNC: 5.2 MG/L (ref 0.6–2.4)
CANCER AG15-3 SERPL-ACNC: 31.2 U/ML (ref 0–25)
CANCER AG19-9 SERPL-ACNC: 16 U/ML (ref 0–35)
CANCER AG27-29 SERPL-ACNC: 33.4 U/ML (ref 0–38.6)
KAPPA LC FREE SER-MCNC: 33 MG/L (ref 3.3–19.4)
KAPPA LC FREE/LAMBDA FREE SER: 1.2 (ref 0.26–1.65)
LAMBDA LC FREE SERPL-MCNC: 27.6 MG/L (ref 5.7–26.3)

## 2024-09-03 RX ORDER — SODIUM CHLORIDE 0.9 % (FLUSH) 0.9 %
5-40 SYRINGE (ML) INJECTION PRN
Status: CANCELLED | OUTPATIENT
Start: 2024-09-03

## 2024-09-03 RX ORDER — SODIUM CHLORIDE 0.9 % (FLUSH) 0.9 %
5-40 SYRINGE (ML) INJECTION EVERY 12 HOURS SCHEDULED
Status: CANCELLED | OUTPATIENT
Start: 2024-09-03

## 2024-09-03 RX ORDER — SODIUM CHLORIDE 9 MG/ML
INJECTION, SOLUTION INTRAVENOUS PRN
Status: CANCELLED | OUTPATIENT
Start: 2024-09-03

## 2024-09-03 NOTE — TELEPHONE ENCOUNTER
Contacted patient regarding scheduling surgery with Dr. Christian. Offered the date of 9/04/2024 and patient accepted. Informed patient that PAT will be reaching out as well as a surgical letter will be sent out, patient understood.

## 2024-09-04 ENCOUNTER — TELEPHONE (OUTPATIENT)
Age: 69
End: 2024-09-04

## 2024-09-04 ENCOUNTER — HOSPITAL ENCOUNTER (OUTPATIENT)
Facility: HOSPITAL | Age: 69
Setting detail: OUTPATIENT SURGERY
Discharge: HOME | End: 2024-09-04
Attending: SURGERY | Admitting: SURGERY
Payer: MEDICARE

## 2024-09-04 ENCOUNTER — ANESTHESIA (OUTPATIENT)
Facility: HOSPITAL | Age: 69
End: 2024-09-04
Payer: MEDICARE

## 2024-09-04 VITALS
RESPIRATION RATE: 9 BRPM | TEMPERATURE: 97.9 F | SYSTOLIC BLOOD PRESSURE: 121 MMHG | HEART RATE: 64 BPM | DIASTOLIC BLOOD PRESSURE: 58 MMHG | OXYGEN SATURATION: 98 %

## 2024-09-04 DIAGNOSIS — R59.0 MESENTERIC LYMPHADENOPATHY: Primary | ICD-10-CM

## 2024-09-04 LAB
ALBUMIN SERPL ELPH-MCNC: 2.9 G/DL (ref 2.9–4.4)
ALBUMIN SERPL ELPH-MCNC: 3.4 G/DL (ref 2.9–4.4)
ALBUMIN/GLOB SERPL: 0.9 (ref 0.7–1.7)
ALBUMIN/GLOB SERPL: 1 (ref 0.7–1.7)
ALPHA1 GLOB SERPL ELPH-MCNC: 0.3 G/DL (ref 0–0.4)
ALPHA1 GLOB SERPL ELPH-MCNC: 0.4 G/DL (ref 0–0.4)
ALPHA2 GLOB SERPL ELPH-MCNC: 0.8 G/DL (ref 0.4–1)
ALPHA2 GLOB SERPL ELPH-MCNC: 1 G/DL (ref 0.4–1)
B-GLOBULIN SERPL ELPH-MCNC: 0.9 G/DL (ref 0.7–1.3)
B-GLOBULIN SERPL ELPH-MCNC: 1.1 G/DL (ref 0.7–1.3)
GAMMA GLOB SERPL ELPH-MCNC: 1 G/DL (ref 0.4–1.8)
GAMMA GLOB SERPL ELPH-MCNC: 1.3 G/DL (ref 0.4–1.8)
GLOBULIN SER CALC-MCNC: 3 G/DL (ref 2.2–3.9)
GLOBULIN SER CALC-MCNC: 3.7 G/DL (ref 2.2–3.9)
M PROTEIN SERPL ELPH-MCNC: ABNORMAL G/DL
M PROTEIN SERPL ELPH-MCNC: NORMAL G/DL
PROT SERPL-MCNC: 5.9 G/DL (ref 6–8.5)
PROT SERPL-MCNC: 7.1 G/DL (ref 6–8.5)

## 2024-09-04 PROCEDURE — 7100000001 HC PACU RECOVERY - ADDTL 15 MIN: Performed by: SURGERY

## 2024-09-04 PROCEDURE — 2580000003 HC RX 258: Performed by: SURGERY

## 2024-09-04 PROCEDURE — 88184 FLOWCYTOMETRY/ TC 1 MARKER: CPT

## 2024-09-04 PROCEDURE — 3700000000 HC ANESTHESIA ATTENDED CARE: Performed by: SURGERY

## 2024-09-04 PROCEDURE — 88305 TISSUE EXAM BY PATHOLOGIST: CPT

## 2024-09-04 PROCEDURE — 3600000004 HC SURGERY LEVEL 4 BASE: Performed by: SURGERY

## 2024-09-04 PROCEDURE — 7100000010 HC PHASE II RECOVERY - FIRST 15 MIN: Performed by: SURGERY

## 2024-09-04 PROCEDURE — 88365 INSITU HYBRIDIZATION (FISH): CPT

## 2024-09-04 PROCEDURE — 6360000002 HC RX W HCPCS

## 2024-09-04 PROCEDURE — 49321 LAPAROSCOPY BIOPSY: CPT | Performed by: PHYSICIAN ASSISTANT

## 2024-09-04 PROCEDURE — 2580000003 HC RX 258: Performed by: STUDENT IN AN ORGANIZED HEALTH CARE EDUCATION/TRAINING PROGRAM

## 2024-09-04 PROCEDURE — 88342 IMHCHEM/IMCYTCHM 1ST ANTB: CPT

## 2024-09-04 PROCEDURE — 2500000003 HC RX 250 WO HCPCS

## 2024-09-04 PROCEDURE — 3600000014 HC SURGERY LEVEL 4 ADDTL 15MIN: Performed by: SURGERY

## 2024-09-04 PROCEDURE — 7100000000 HC PACU RECOVERY - FIRST 15 MIN: Performed by: SURGERY

## 2024-09-04 PROCEDURE — 6360000002 HC RX W HCPCS: Performed by: NURSE ANESTHETIST, CERTIFIED REGISTERED

## 2024-09-04 PROCEDURE — 88341 IMHCHEM/IMCYTCHM EA ADD ANTB: CPT

## 2024-09-04 PROCEDURE — 2709999900 HC NON-CHARGEABLE SUPPLY: Performed by: SURGERY

## 2024-09-04 PROCEDURE — 88360 TUMOR IMMUNOHISTOCHEM/MANUAL: CPT

## 2024-09-04 PROCEDURE — 49321 LAPAROSCOPY BIOPSY: CPT | Performed by: SURGERY

## 2024-09-04 PROCEDURE — 6360000002 HC RX W HCPCS: Performed by: SURGERY

## 2024-09-04 PROCEDURE — 88185 FLOWCYTOMETRY/TC ADD-ON: CPT

## 2024-09-04 PROCEDURE — 2500000003 HC RX 250 WO HCPCS: Performed by: NURSE ANESTHETIST, CERTIFIED REGISTERED

## 2024-09-04 PROCEDURE — 6370000000 HC RX 637 (ALT 250 FOR IP): Performed by: STUDENT IN AN ORGANIZED HEALTH CARE EDUCATION/TRAINING PROGRAM

## 2024-09-04 PROCEDURE — 7100000011 HC PHASE II RECOVERY - ADDTL 15 MIN: Performed by: SURGERY

## 2024-09-04 PROCEDURE — 2500000003 HC RX 250 WO HCPCS: Performed by: SURGERY

## 2024-09-04 PROCEDURE — 88331 PATH CONSLTJ SURG 1 BLK 1SPC: CPT

## 2024-09-04 PROCEDURE — 3700000001 HC ADD 15 MINUTES (ANESTHESIA): Performed by: SURGERY

## 2024-09-04 PROCEDURE — 2720000010 HC SURG SUPPLY STERILE: Performed by: SURGERY

## 2024-09-04 RX ORDER — FENTANYL CITRATE 50 UG/ML
25 INJECTION, SOLUTION INTRAMUSCULAR; INTRAVENOUS EVERY 5 MIN PRN
Status: DISCONTINUED | OUTPATIENT
Start: 2024-09-04 | End: 2024-09-04 | Stop reason: HOSPADM

## 2024-09-04 RX ORDER — ACETAMINOPHEN 500 MG
1000 TABLET ORAL ONCE
Status: COMPLETED | OUTPATIENT
Start: 2024-09-04 | End: 2024-09-04

## 2024-09-04 RX ORDER — SUCCINYLCHOLINE/SOD CL,ISO/PF 200MG/10ML
SYRINGE (ML) INTRAVENOUS PRN
Status: DISCONTINUED | OUTPATIENT
Start: 2024-09-04 | End: 2024-09-04 | Stop reason: SDUPTHER

## 2024-09-04 RX ORDER — SODIUM CHLORIDE 9 MG/ML
INJECTION, SOLUTION INTRAVENOUS PRN
Status: DISCONTINUED | OUTPATIENT
Start: 2024-09-04 | End: 2024-09-04 | Stop reason: HOSPADM

## 2024-09-04 RX ORDER — ROCURONIUM BROMIDE 10 MG/ML
INJECTION, SOLUTION INTRAVENOUS PRN
Status: DISCONTINUED | OUTPATIENT
Start: 2024-09-04 | End: 2024-09-04 | Stop reason: SDUPTHER

## 2024-09-04 RX ORDER — PROPOFOL 10 MG/ML
INJECTION, EMULSION INTRAVENOUS PRN
Status: DISCONTINUED | OUTPATIENT
Start: 2024-09-04 | End: 2024-09-04 | Stop reason: SDUPTHER

## 2024-09-04 RX ORDER — KETOROLAC TROMETHAMINE 30 MG/ML
INJECTION, SOLUTION INTRAMUSCULAR; INTRAVENOUS PRN
Status: DISCONTINUED | OUTPATIENT
Start: 2024-09-04 | End: 2024-09-04 | Stop reason: SDUPTHER

## 2024-09-04 RX ORDER — PROCHLORPERAZINE EDISYLATE 5 MG/ML
5 INJECTION INTRAMUSCULAR; INTRAVENOUS
Status: DISCONTINUED | OUTPATIENT
Start: 2024-09-04 | End: 2024-09-04 | Stop reason: HOSPADM

## 2024-09-04 RX ORDER — DOCUSATE SODIUM 100 MG/1
100 CAPSULE, LIQUID FILLED ORAL 2 TIMES DAILY
Qty: 20 CAPSULE | Refills: 0 | Status: SHIPPED | OUTPATIENT
Start: 2024-09-04 | End: 2024-09-04

## 2024-09-04 RX ORDER — ONDANSETRON 2 MG/ML
4 INJECTION INTRAMUSCULAR; INTRAVENOUS
Status: DISCONTINUED | OUTPATIENT
Start: 2024-09-04 | End: 2024-09-04 | Stop reason: HOSPADM

## 2024-09-04 RX ORDER — HYDRALAZINE HYDROCHLORIDE 20 MG/ML
10 INJECTION INTRAMUSCULAR; INTRAVENOUS
Status: DISCONTINUED | OUTPATIENT
Start: 2024-09-04 | End: 2024-09-04 | Stop reason: HOSPADM

## 2024-09-04 RX ORDER — DOCUSATE SODIUM 100 MG/1
100 CAPSULE, LIQUID FILLED ORAL 2 TIMES DAILY
Qty: 20 CAPSULE | Refills: 0 | Status: SHIPPED | OUTPATIENT
Start: 2024-09-04 | End: 2024-09-14

## 2024-09-04 RX ORDER — BUPIVACAINE HYDROCHLORIDE AND EPINEPHRINE 2.5; 5 MG/ML; UG/ML
INJECTION, SOLUTION EPIDURAL; INFILTRATION; INTRACAUDAL; PERINEURAL PRN
Status: DISCONTINUED | OUTPATIENT
Start: 2024-09-04 | End: 2024-09-04 | Stop reason: HOSPADM

## 2024-09-04 RX ORDER — OXYCODONE HYDROCHLORIDE 5 MG/1
5 TABLET ORAL
Status: DISCONTINUED | OUTPATIENT
Start: 2024-09-04 | End: 2024-09-04 | Stop reason: HOSPADM

## 2024-09-04 RX ORDER — SODIUM CHLORIDE 0.9 % (FLUSH) 0.9 %
5-40 SYRINGE (ML) INJECTION EVERY 12 HOURS SCHEDULED
Status: DISCONTINUED | OUTPATIENT
Start: 2024-09-04 | End: 2024-09-04 | Stop reason: HOSPADM

## 2024-09-04 RX ORDER — FENTANYL CITRATE 50 UG/ML
100 INJECTION, SOLUTION INTRAMUSCULAR; INTRAVENOUS
Status: DISCONTINUED | OUTPATIENT
Start: 2024-09-04 | End: 2024-09-04 | Stop reason: HOSPADM

## 2024-09-04 RX ORDER — PHENYLEPHRINE HCL IN 0.9% NACL 0.4MG/10ML
SYRINGE (ML) INTRAVENOUS PRN
Status: DISCONTINUED | OUTPATIENT
Start: 2024-09-04 | End: 2024-09-04 | Stop reason: SDUPTHER

## 2024-09-04 RX ORDER — FENTANYL CITRATE 50 UG/ML
INJECTION, SOLUTION INTRAMUSCULAR; INTRAVENOUS PRN
Status: DISCONTINUED | OUTPATIENT
Start: 2024-09-04 | End: 2024-09-04 | Stop reason: SDUPTHER

## 2024-09-04 RX ORDER — MIDAZOLAM HYDROCHLORIDE 2 MG/2ML
2 INJECTION, SOLUTION INTRAMUSCULAR; INTRAVENOUS
Status: DISCONTINUED | OUTPATIENT
Start: 2024-09-04 | End: 2024-09-04 | Stop reason: HOSPADM

## 2024-09-04 RX ORDER — ONDANSETRON 2 MG/ML
INJECTION INTRAMUSCULAR; INTRAVENOUS PRN
Status: DISCONTINUED | OUTPATIENT
Start: 2024-09-04 | End: 2024-09-04 | Stop reason: SDUPTHER

## 2024-09-04 RX ORDER — LIDOCAINE HYDROCHLORIDE 10 MG/ML
1 INJECTION, SOLUTION EPIDURAL; INFILTRATION; INTRACAUDAL; PERINEURAL
Status: DISCONTINUED | OUTPATIENT
Start: 2024-09-04 | End: 2024-09-04 | Stop reason: HOSPADM

## 2024-09-04 RX ORDER — SODIUM CHLORIDE 0.9 % (FLUSH) 0.9 %
5-40 SYRINGE (ML) INJECTION PRN
Status: DISCONTINUED | OUTPATIENT
Start: 2024-09-04 | End: 2024-09-04 | Stop reason: HOSPADM

## 2024-09-04 RX ORDER — DEXAMETHASONE SODIUM PHOSPHATE 4 MG/ML
INJECTION, SOLUTION INTRA-ARTICULAR; INTRALESIONAL; INTRAMUSCULAR; INTRAVENOUS; SOFT TISSUE PRN
Status: DISCONTINUED | OUTPATIENT
Start: 2024-09-04 | End: 2024-09-04 | Stop reason: SDUPTHER

## 2024-09-04 RX ORDER — OXYCODONE AND ACETAMINOPHEN 5; 325 MG/1; MG/1
1 TABLET ORAL EVERY 6 HOURS PRN
Qty: 20 TABLET | Refills: 0 | Status: SHIPPED | OUTPATIENT
Start: 2024-09-04 | End: 2024-09-11

## 2024-09-04 RX ORDER — SODIUM CHLORIDE, SODIUM LACTATE, POTASSIUM CHLORIDE, CALCIUM CHLORIDE 600; 310; 30; 20 MG/100ML; MG/100ML; MG/100ML; MG/100ML
INJECTION, SOLUTION INTRAVENOUS CONTINUOUS
Status: DISCONTINUED | OUTPATIENT
Start: 2024-09-04 | End: 2024-09-04 | Stop reason: HOSPADM

## 2024-09-04 RX ORDER — NALOXONE HYDROCHLORIDE 0.4 MG/ML
INJECTION, SOLUTION INTRAMUSCULAR; INTRAVENOUS; SUBCUTANEOUS PRN
Status: DISCONTINUED | OUTPATIENT
Start: 2024-09-04 | End: 2024-09-04 | Stop reason: HOSPADM

## 2024-09-04 RX ORDER — OXYCODONE AND ACETAMINOPHEN 5; 325 MG/1; MG/1
1 TABLET ORAL EVERY 6 HOURS PRN
Qty: 20 TABLET | Refills: 0 | Status: SHIPPED | OUTPATIENT
Start: 2024-09-04 | End: 2024-09-04

## 2024-09-04 RX ORDER — LIDOCAINE HYDROCHLORIDE 20 MG/ML
INJECTION, SOLUTION EPIDURAL; INFILTRATION; INTRACAUDAL; PERINEURAL PRN
Status: DISCONTINUED | OUTPATIENT
Start: 2024-09-04 | End: 2024-09-04 | Stop reason: SDUPTHER

## 2024-09-04 RX ADMIN — PROPOFOL 150 MG: 10 INJECTION, EMULSION INTRAVENOUS at 14:16

## 2024-09-04 RX ADMIN — ONDANSETRON 4 MG: 2 INJECTION INTRAMUSCULAR; INTRAVENOUS at 14:20

## 2024-09-04 RX ADMIN — KETOROLAC TROMETHAMINE 15 MG: 30 INJECTION, SOLUTION INTRAMUSCULAR at 15:11

## 2024-09-04 RX ADMIN — Medication 40 MCG: at 14:16

## 2024-09-04 RX ADMIN — ACETAMINOPHEN 1000 MG: 500 TABLET ORAL at 12:09

## 2024-09-04 RX ADMIN — Medication 40 MCG: at 14:19

## 2024-09-04 RX ADMIN — ROCURONIUM BROMIDE 10 MG: 10 INJECTION, SOLUTION INTRAVENOUS at 14:16

## 2024-09-04 RX ADMIN — SUGAMMADEX 160 MG: 100 INJECTION, SOLUTION INTRAVENOUS at 15:09

## 2024-09-04 RX ADMIN — SODIUM CHLORIDE, POTASSIUM CHLORIDE, SODIUM LACTATE AND CALCIUM CHLORIDE: 600; 310; 30; 20 INJECTION, SOLUTION INTRAVENOUS at 12:21

## 2024-09-04 RX ADMIN — Medication 120 MG: at 14:16

## 2024-09-04 RX ADMIN — FENTANYL CITRATE 50 MCG: 50 INJECTION, SOLUTION INTRAMUSCULAR; INTRAVENOUS at 14:16

## 2024-09-04 RX ADMIN — ROCURONIUM BROMIDE 20 MG: 10 INJECTION, SOLUTION INTRAVENOUS at 14:20

## 2024-09-04 RX ADMIN — WATER 2000 MG: 1 INJECTION INTRAMUSCULAR; INTRAVENOUS; SUBCUTANEOUS at 14:20

## 2024-09-04 RX ADMIN — DEXAMETHASONE SODIUM PHOSPHATE 4 MG: 4 INJECTION INTRA-ARTICULAR; INTRALESIONAL; INTRAMUSCULAR; INTRAVENOUS; SOFT TISSUE at 14:20

## 2024-09-04 RX ADMIN — Medication 40 MCG: at 14:26

## 2024-09-04 RX ADMIN — FENTANYL CITRATE 50 MCG: 50 INJECTION, SOLUTION INTRAMUSCULAR; INTRAVENOUS at 14:36

## 2024-09-04 RX ADMIN — Medication 40 MCG: at 14:31

## 2024-09-04 RX ADMIN — LIDOCAINE HYDROCHLORIDE 80 MG: 20 INJECTION, SOLUTION EPIDURAL; INFILTRATION; INTRACAUDAL; PERINEURAL at 14:16

## 2024-09-04 ASSESSMENT — PAIN SCALES - GENERAL: PAINLEVEL_OUTOF10: 0

## 2024-09-04 NOTE — H&P
[facility] History and Physical    History of Present Illness:      Shana Joiner is a 69 y.o. female who presents for surgical lymph node biopsy.  She recently presented to the hospital with itching, weakness, malaise and unsteady gait.  She was found to have hypercalcemia and had CT imaging.  This showed evidence of central abdominal/mesenteric lymphadenopathy as well as an indeterminate lesion in the spleen.  This raised concern for possible lymphoma.  The mesenteric lymphadenopathy was not felt to be amenable to percutaneous biopsy approach and she is subsequently referred for surgical biopsy.  The patient denies any significant weight loss.  No changes in bowel habits.  No history of previous upper abdominal surgery.    Past Medical History:   Diagnosis Date    Bladder prolapse, female, acquired     Prolapsed uterus        Past Surgical History:   Procedure Laterality Date    BLADDER SUSPENSION      HYSTERECTOMY (CERVIX STATUS UNKNOWN)           Current Facility-Administered Medications:     lidocaine PF 1 % injection 1 mL, 1 mL, IntraDERmal, Once PRN, Eusebia Graham L, DO    fentaNYL (SUBLIMAZE) injection 100 mcg, 100 mcg, IntraVENous, Once PRN, Eusebia Graham L, DO    lactated ringers IV soln infusion, , IntraVENous, Continuous, Eusebia Graham L, DO, Last Rate: 125 mL/hr at 09/04/24 1221, New Bag at 09/04/24 1221    sodium chloride flush 0.9 % injection 5-40 mL, 5-40 mL, IntraVENous, 2 times per day, Eusebia Graham L, DO    sodium chloride flush 0.9 % injection 5-40 mL, 5-40 mL, IntraVENous, PRN, Eusebai Graham L, DO    0.9 % sodium chloride infusion, , IntraVENous, PRN, Eusebia Graham, DO    midazolam PF (VERSED) injection 2 mg, 2 mg, IntraVENous, Once PRN, Eusebia Graham L, DO    sodium chloride flush 0.9 % injection 5-40 mL, 5-40 mL, IntraVENous, 2 times per day, Travon Christian MD    sodium chloride flush 0.9 % injection 5-40 mL, 5-40 mL, IntraVENous, PRN, White,

## 2024-09-04 NOTE — TELEPHONE ENCOUNTER
Call Center Message Received:      [phone]  From: pt.  RE: Shana Joiner  1955  pt has surgery tomorrow at 12:30 PM with Dr Christian. received a call from someone saying that they were going to send an email with information on what she needs to do but has not received the e-mail. pt. cjecked spam and and email. pt email is [email or web address]

## 2024-09-04 NOTE — BRIEF OP NOTE
Brief Postoperative Note      Patient: Shana Joiner  YOB: 1955  MRN: [id number]    Date of Procedure: 9/4/2024    Pre-Op Diagnosis Codes:      Mesenteric mass/lymphadenopathy    Post-Op Diagnosis: Same       Procedure(s):  LAPAROSCOPIC BIOPSY OF MESENTERIC MASS    Surgeon(s):  Travon Christian MD    Assistant:  Physician Assistant: Lorraine Macias PA    Anesthesia: General    Estimated Blood Loss (mL): 10 mL    Complications: None    Specimens:   ID Type Source Tests Collected by Time Destination   1 : MESENTERIC LYMPH NODE Tissue Lymph Node SURGICAL PATHOLOGY Travon Christian MD 9/4/2024 1452    2 : MESENTERIC LYMPH NODE Tissue Lymph Node SURGICAL PATHOLOGY Travon Christian MD 9/4/2024 1500        Implants:  * No implants in log *      Drains: * No LDAs found *    Findings:  Infection Present At Time Of Surgery (PATOS) (choose all levels that have infection present):  No infection present  Other Findings: Large node/mass adjacent to ligament of treitz.  A portion of this node was taken for pathology.  Preliminary findings on frozen section suggest malignancy.  No obvious small bowel or colonic tumor noted in that area.  No signs of carcinomatosis, ascites or omental/peritoneal disease.      Electronically signed by Travon Christian MD on 9/4/2024 at 3:32 PM

## 2024-09-04 NOTE — FLOWSHEET NOTE
09/04/24 1450   Family Communication    Relationship to Patient Spouse    Phone Number MARIEL [phone]   Family/Significant Other Update Called;Updated   Delivery Origin Nurse   Message Disposition Family present - message delivered   Update Given Yes   Family Communication   Family Update Message Procedure started;Surgeon working;Patient stable

## 2024-09-04 NOTE — TELEPHONE ENCOUNTER
Patient called in regarding supposedly being sent an email. Informed her we cannot email surgical information out. Went over pre op instructions with her because she stated that no one from the hospital called her to go over these things.

## 2024-09-04 NOTE — ANESTHESIA PRE PROCEDURE
Department of Anesthesiology  Preprocedure Note       Name:  Shana Joiner   Age:  69 y.o.  :  1955                                          MRN:  [id number]         Date:  2024      Surgeon: Surgeon(s):  Travon Christian MD    Procedure: Procedure(s):  LAPAROSCOPIC BIOPSY OF MESENTERIC MASS    Medications prior to admission:   Prior to Admission medications    Not on File       Current medications:    Current Facility-Administered Medications   Medication Dose Route Frequency Provider Last Rate Last Admin   • lidocaine PF 1 % injection 1 mL  1 mL IntraDERmal Once PRN Eusebia Graham L, DO       • fentaNYL (SUBLIMAZE) injection 100 mcg  100 mcg IntraVENous Once PRN Eusebia Graham L, DO       • lactated ringers IV soln infusion   IntraVENous Continuous Eusebia Graham L,  mL/hr at 24 1221 New Bag at 24 1221   • sodium chloride flush 0.9 % injection 5-40 mL  5-40 mL IntraVENous 2 times per day Eusebia Graham L, DO       • sodium chloride flush 0.9 % injection 5-40 mL  5-40 mL IntraVENous PRN Eusebia Graham L, DO       • 0.9 % sodium chloride infusion   IntraVENous PRN Eusebia Graham L, DO       • midazolam PF (VERSED) injection 2 mg  2 mg IntraVENous Once PRN Eusebia Graham L, DO       • sodium chloride flush 0.9 % injection 5-40 mL  5-40 mL IntraVENous 2 times per day Travon Christian MD       • sodium chloride flush 0.9 % injection 5-40 mL  5-40 mL IntraVENous PRN Travon Christian MD       • 0.9 % sodium chloride infusion   IntraVENous PRN Travon Christian MD       • ceFAZolin (ANCEF) 2,000 mg in sterile water 20 mL IV syringe  2,000 mg IntraVENous On Call to OR Travon Christian MD           Allergies:  No Known Allergies    Problem List:    Patient Active Problem List   Diagnosis Code   • Hypercalcemia E83.52   • Mesenteric adenitis I88.0       Past Medical History:        Diagnosis Date   • Bladder prolapse, female, acquired    • Prolapsed uterus        Past Surgical

## 2024-09-05 NOTE — ANESTHESIA POSTPROCEDURE EVALUATION
Post-Anesthesia Evaluation and Assessment    Patient: Shana Joiner MRN: [id number]  SSN: [id number]    YOB: 1955  Age: 69 y.o.  Sex: female      I have evaluated the patient and they are stable and ready for discharge from the PACU.     Cardiovascular Function/Vital Signs  Visit Vitals  BP (!) 121/58   Pulse 64   Temp 97.9 °F (36.6 °C) (Axillary)   Resp (!) 9   SpO2 98%       Patient is status post General anesthesia for Procedure(s):  LAPAROSCOPIC BIOPSY OF MESENTERIC MASS.    Nausea/Vomiting: None    Postoperative hydration reviewed and adequate.    Pain:      Managed    Neurological Status:       At baseline    Mental Status, Level of Consciousness: Alert and  oriented to person, place, and time    Pulmonary Status:       Adequate oxygenation and airway patent    Complications related to anesthesia: None    Post-anesthesia assessment completed. No concerns    Signed By: Armond Ahuja MD     September 5, 2024            Department of Anesthesiology  Postprocedure Note    Patient: Shana Joiner  MRN: [id number]  YOB: 1955  Date of evaluation: 9/5/2024    Procedure Summary       Date: 09/04/24 Room / Location: [facility] MAIN OR [address] [place] [address] MAIN OR    Anesthesia Start: 1408 Anesthesia Stop: 1538    Procedure: LAPAROSCOPIC BIOPSY OF MESENTERIC MASS (Abdomen) Diagnosis:       Mesenteric adenitis      (Mesenteric adenitis [I88.0])    Providers: Travon Christian MD Responsible Provider: Armond Ahuja MD    Anesthesia Type: general ASA Status: 2            Anesthesia Type: No value filed.    Taylor Phase I: Taylor Score: 10    Taylor Phase II: Taylor Score: 10    Anesthesia Post Evaluation    No notable events documented.

## 2024-09-05 NOTE — OP NOTE
[address]  [address]                            OPERATIVE REPORT      PATIENT NAME: AMRITA DIOR                   : 1955  MED REC NO: [id number]                       ROOM: OR  ACCOUNT NO: [id number]                       ADMIT DATE: 2024  PROVIDER: Travon Christian MD    DATE OF SERVICE:  2024    PREOPERATIVE DIAGNOSES:  Mesenteric mass/lymphadenopathy.    POSTOPERATIVE DIAGNOSES:  Mesenteric mass/lymphadenopathy.    PROCEDURES PERFORMED:  Diagnostic laparoscopy with biopsy of mesenteric mass.    SURGEON:  Travon Christian MD    ASSISTANT:  BENJA Eli's assistance was required secondary to the complex nature of this laparoscopic operation.  She assisted throughout with retraction, exposure, and with closure.    ANESTHESIA:  General.    ESTIMATED BLOOD LOSS:  10 mL.    SPECIMENS REMOVED:       1. Mesenteric lymph node.     2. Mesenteric lymph node.  Disposition of both specimens is to pathology.    INTRAOPERATIVE FINDINGS:  The patient had a large node/mass adjacent to the ligament of Treitz along the medial aspect of the 4th portion of the duodenum as a transition to jejunum.  A portion of this [place] mass was taken for pathology and preliminary findings suggested malignancy on frozen section.  There was no obvious small bowel or colonic tumor noted in that area, although the more proximal 3rd and 4th portions of the duodenum were not visualized during this operation.  There were no signs of carcinomatosis, ascites, or omental/peritoneal disease noted.     COMPLICATIONS:  None.    IMPLANTS:  None.    INDICATIONS:  The patient is a 69-year-old female, who recently presented to the hospital with symptoms including weakness, malaise, and unsteady gait.  She had hypercalcemia and had CT imaging for further workup of this that showed central abdominal/mesenteric lymphadenopathy as well as an indeterminate  Additional local anesthetic was injected at all incision sites.  All skin incisions were closed using 4-0 Monocryl subcuticular stitches followed by Dermabond skin adhesive.  Dermabond skin adhesive was placed over the Veress needle site in the left upper quadrant.  The patient was then awakened, extubated, and taken to the postanesthesia care unit in stable condition.  All needle and instrument counts were correct at the completion of the case.  I was present and scrubbed throughout the entirety of   the case.  There were no immediate complications.    DISPOSITION:  PACU.        MD ELEANOR GÓMEZ/MAR  D:  09/04/2024 15:49:39  T:  09/04/2024 21:42:45  JOB #:  [id number]

## 2024-09-12 LAB — PTH RELATED PROT SERPL-SCNC: <2 PMOL/L

## 2024-09-19 ENCOUNTER — OFFICE VISIT (OUTPATIENT)
Age: 69
End: 2024-09-19

## 2024-09-19 VITALS
RESPIRATION RATE: 14 BRPM | OXYGEN SATURATION: 97 % | HEIGHT: 63 IN | SYSTOLIC BLOOD PRESSURE: 109 MMHG | DIASTOLIC BLOOD PRESSURE: 69 MMHG | BODY MASS INDEX: 31.08 KG/M2 | TEMPERATURE: 97.7 F | WEIGHT: 175.4 LBS | HEART RATE: 69 BPM

## 2024-09-19 DIAGNOSIS — R59.0 MESENTERIC LYMPHADENOPATHY: Primary | ICD-10-CM

## 2024-09-19 PROCEDURE — 99024 POSTOP FOLLOW-UP VISIT: CPT | Performed by: PHYSICIAN ASSISTANT

## 2024-09-19 ASSESSMENT — PATIENT HEALTH QUESTIONNAIRE - PHQ9
SUM OF ALL RESPONSES TO PHQ QUESTIONS 1-9: 0
SUM OF ALL RESPONSES TO PHQ9 QUESTIONS 1 & 2: 0
SUM OF ALL RESPONSES TO PHQ QUESTIONS 1-9: 0
SUM OF ALL RESPONSES TO PHQ QUESTIONS 1-9: 0
1. LITTLE INTEREST OR PLEASURE IN DOING THINGS: NOT AT ALL
SUM OF ALL RESPONSES TO PHQ QUESTIONS 1-9: 0
2. FEELING DOWN, DEPRESSED OR HOPELESS: NOT AT ALL

## 2024-09-23 ENCOUNTER — HOSPITAL ENCOUNTER (OUTPATIENT)
Facility: HOSPITAL | Age: 69
Discharge: HOME | End: 2024-09-26
Attending: INTERNAL MEDICINE
Payer: MEDICARE

## 2024-09-23 DIAGNOSIS — C83.37: ICD-10-CM

## 2024-09-23 LAB
GLUCOSE BLD STRIP.AUTO-MCNC: 96 MG/DL (ref 65–117)
SERVICE CMNT-IMP: NORMAL

## 2024-09-23 PROCEDURE — A9609 HC RX DIAGNOSTIC RADIOPHARMACEUTICAL: HCPCS | Performed by: INTERNAL MEDICINE

## 2024-09-23 PROCEDURE — 82962 GLUCOSE BLOOD TEST: CPT

## 2024-09-23 PROCEDURE — 3430000000 HC RX DIAGNOSTIC RADIOPHARMACEUTICAL: Performed by: INTERNAL MEDICINE

## 2024-09-23 PROCEDURE — 78815 PET IMAGE W/CT SKULL-THIGH: CPT

## 2024-09-23 RX ORDER — FLUDEOXYGLUCOSE F-18 500 MCI/ML
10 INJECTION INTRAVENOUS
Status: COMPLETED | OUTPATIENT
Start: 2024-09-23 | End: 2024-09-23

## 2024-09-23 RX ADMIN — FLUDEOXYGLUCOSE F-18 10 MILLICURIE: 500 INJECTION INTRAVENOUS at 08:00

## (undated) DEVICE — X-RAY DETECTABLE SPONGES,16 PLY: Brand: VISTEC

## (undated) DEVICE — GENERAL LAPAROSCOPY - SMH: Brand: MEDLINE INDUSTRIES, INC.

## (undated) DEVICE — 4-PORT MANIFOLD: Brand: NEPTUNE 2

## (undated) DEVICE — GARMENT,MEDLINE,DVT,INT,CALF,MED, GEN2: Brand: MEDLINE

## (undated) DEVICE — GLOVE ORANGE PI 7 1/2   MSG9075

## (undated) DEVICE — GLOVE SURG SZ 8 L12IN FNGR THK79MIL GRN LTX FREE

## (undated) DEVICE — STAPLER ECHELON 3000 45MM STANDARD

## (undated) DEVICE — SUTURE MONOCRYL + SZ 4-0 L27IN ABSRB UD L19MM PS-2 3/8 CIR MCP426H

## (undated) DEVICE — SYSTEM EVAC SMOKE LAPARSCOPIC

## (undated) DEVICE — SEALER ONE-STEP 37CM LIGASURE MARYLAND XP

## (undated) DEVICE — INSUFFLATION NEEDLE TO ESTABLISH PNEUMOPERITONEUM.: Brand: INSUFFLATION NEEDLE

## (undated) DEVICE — LIQUIBAND RAPID ADHESIVE 36/CS 0.8ML: Brand: MEDLINE

## (undated) DEVICE — LAPAROSCOPIC TROCAR SLEEVE/SINGLE USE: Brand: KII® OPTICAL ACCESS SYSTEM

## (undated) DEVICE — HYPODERMIC SAFETY NEEDLE: Brand: MAGELLAN

## (undated) DEVICE — TROCAR: Brand: KII® SLEEVE